# Patient Record
Sex: MALE | Race: BLACK OR AFRICAN AMERICAN
[De-identification: names, ages, dates, MRNs, and addresses within clinical notes are randomized per-mention and may not be internally consistent; named-entity substitution may affect disease eponyms.]

---

## 2018-12-09 ENCOUNTER — HOSPITAL ENCOUNTER (EMERGENCY)
Dept: HOSPITAL 61 - ER | Age: 23
Discharge: HOME | End: 2018-12-09
Payer: SELF-PAY

## 2018-12-09 VITALS — DIASTOLIC BLOOD PRESSURE: 50 MMHG | SYSTOLIC BLOOD PRESSURE: 100 MMHG

## 2018-12-09 VITALS — WEIGHT: 140 LBS | HEIGHT: 68 IN | BODY MASS INDEX: 21.22 KG/M2

## 2018-12-09 DIAGNOSIS — Z20.2: Primary | ICD-10-CM

## 2018-12-09 DIAGNOSIS — Z88.0: ICD-10-CM

## 2018-12-09 PROCEDURE — 96372 THER/PROPH/DIAG INJ SC/IM: CPT

## 2018-12-09 PROCEDURE — 99283 EMERGENCY DEPT VISIT LOW MDM: CPT

## 2018-12-09 NOTE — PHYS DOC
Adult General


Chief Complaint


Chief Complaint:  SEXUALLY TRANSMITTED DISEASE





HPI


HPI





Patient is a 23  year old male who presents to the ED to be treated for STDs. 

Patient's significant other was evaluated by me and was positive for 

Trichomonas. I requested patient to sign in and be treated. Patient has no 

symptoms.





Review of Systems


Review of Systems





Constitutional: Denies fever or chills []


: Request STD treatment. Denies dysuria or hematuria []


Musculoskeletal: Denies back pain or joint pain []


Integument: Denies rash or skin lesions []


Neurologic: Denies headache, focal weakness or sensory changes []








All other systems were reviewed and found to be within normal limits, except as 

documented in this note.





Current Medications


Current Medications





Current Medications








 Medications


  (Trade)  Dose


 Ordered  Sig/Adonis  Start Time


 Stop Time Status Last Admin


Dose Admin


 


 Azithromycin


  (Zithromax)  1,000 mg  1X  ONCE  12/9/18 18:30


 12/9/18 18:31 DC 12/9/18 18:11


1,000 MG


 


 Ceftriaxone Sodium


  (Rocephin Im)  250 mg  1X  ONCE  12/9/18 18:30


 12/9/18 18:31 DC 12/9/18 18:10


250 MG


 


 Metronidazole


  (Flagyl)  2,000 mg  1X  ONCE  12/9/18 18:30


 12/9/18 18:31 DC 12/9/18 18:11


2,000 MG











Allergies


Allergies





Allergies








Coded Allergies Type Severity Reaction Last Updated Verified


 


  Penicillins Allergy Intermediate rash 12/9/18 Yes











Physical Exam


Physical Exam





Constitutional: Well developed, well nourished, no acute distress, non-toxic 

appearance. []


Abdomen: Bowel sounds normal, soft, no tenderness, no masses, no pulsatile 

masses. [] 


Skin: Warm, dry, no erythema, no rash. [] 


Back: No tenderness, no CVA tenderness. [] 


Extremities: No tenderness, no cyanosis, no clubbing, ROM intact, no edema. [] 


Neurologic: Alert and oriented X 3, normal motor function, normal sensory 

function, no focal deficits noted. []


Psychologic: Affect normal, judgement normal, mood normal. []





Current Patient Data


Vital Signs





 Vital Signs








  Date Time  Temp Pulse Resp B/P (MAP) Pulse Ox O2 Delivery O2 Flow Rate FiO2


 


12/9/18 18:04 98.4 62 16 100/50 (67) 99 Room Air  





 98.4       











EKG


EKG


[]





Radiology/Procedures


Radiology/Procedures


[]





Course & Med Decision Making


Course & Med Decision Making


Pertinent Labs and Imaging studies reviewed. (See chart for details)





This is a 23-year-old male patient in the ED to be treated for STDs, 

significant other tested positive for Trichomonas. I requested patient to sign 

in and be treated. He was given the standard STD treatment. Education provided 

including Flagyl Rocephin and azithromycin. Follow-up with health department as 

needed.





Dragon Disclaimer


Dragon Disclaimer


This electronic medical record was generated, in whole or in part, using a 

voice recognition dictation system.





Departure


Departure


Impression:  


 Primary Impression:  


 Concern about STD in male without diagnosis


Disposition:  01 HOME, SELF-CARE


Condition:  STABLE


Referrals:  


NON,STAFF (PCP)


Follow-up with the health department as needed


Patient Instructions:  Sexually Transmitted Disease





Additional Instructions:  


You were treated for sexually transmitted diseases primarily Trichomonas 

gonorrhea and chlamydia. We highly recommend you use protection at all times. 

Contact all your sex partners, let them know you treated for STDs and ask them 

to seek treatment too.





Attending Signature


Attending Signature


I have reviewed the PA/NP's note and plan of care. I was available for 

consultation as needed during the patient's visit in the emergency department. 

I agree with the clinical impression, plan, and disposition.











LISETH CLOUD Dec 9, 2018 18:04


ESTRELLITA DIGGS DO Dec 12, 2018 20:01